# Patient Record
Sex: MALE | Race: AMERICAN INDIAN OR ALASKA NATIVE | NOT HISPANIC OR LATINO | Employment: OTHER | URBAN - METROPOLITAN AREA
[De-identification: names, ages, dates, MRNs, and addresses within clinical notes are randomized per-mention and may not be internally consistent; named-entity substitution may affect disease eponyms.]

---

## 2017-01-25 ENCOUNTER — ANESTHESIA (OUTPATIENT)
Dept: SURGERY | Facility: CLINIC | Age: 52
End: 2017-01-25
Payer: OTHER MISCELLANEOUS

## 2017-01-25 ENCOUNTER — ANESTHESIA EVENT (OUTPATIENT)
Dept: SURGERY | Facility: CLINIC | Age: 52
End: 2017-01-25
Payer: OTHER MISCELLANEOUS

## 2017-01-25 ENCOUNTER — SURGERY (OUTPATIENT)
Age: 52
End: 2017-01-25

## 2017-01-25 PROCEDURE — 25000125 ZZHC RX 250: Performed by: NURSE ANESTHETIST, CERTIFIED REGISTERED

## 2017-01-25 PROCEDURE — 25000128 H RX IP 250 OP 636: Performed by: NURSE ANESTHETIST, CERTIFIED REGISTERED

## 2017-01-25 PROCEDURE — 25000125 ZZHC RX 250: Performed by: PHYSICIAN ASSISTANT

## 2017-01-25 PROCEDURE — S0077 INJECTION, CLINDAMYCIN PHOSP: HCPCS | Performed by: PHYSICIAN ASSISTANT

## 2017-01-25 PROCEDURE — 25800025 ZZH RX 258: Performed by: NURSE ANESTHETIST, CERTIFIED REGISTERED

## 2017-01-25 RX ORDER — PROPOFOL 10 MG/ML
INJECTION, EMULSION INTRAVENOUS PRN
Status: DISCONTINUED | OUTPATIENT
Start: 2017-01-25 | End: 2017-01-25

## 2017-01-25 RX ORDER — FENTANYL CITRATE 50 UG/ML
INJECTION, SOLUTION INTRAMUSCULAR; INTRAVENOUS PRN
Status: DISCONTINUED | OUTPATIENT
Start: 2017-01-25 | End: 2017-01-25

## 2017-01-25 RX ORDER — SODIUM CHLORIDE, SODIUM LACTATE, POTASSIUM CHLORIDE, CALCIUM CHLORIDE 600; 310; 30; 20 MG/100ML; MG/100ML; MG/100ML; MG/100ML
INJECTION, SOLUTION INTRAVENOUS CONTINUOUS PRN
Status: DISCONTINUED | OUTPATIENT
Start: 2017-01-25 | End: 2017-01-25

## 2017-01-25 RX ORDER — GLYCOPYRROLATE 0.2 MG/ML
INJECTION, SOLUTION INTRAMUSCULAR; INTRAVENOUS PRN
Status: DISCONTINUED | OUTPATIENT
Start: 2017-01-25 | End: 2017-01-25

## 2017-01-25 RX ORDER — PROPOFOL 10 MG/ML
INJECTION, EMULSION INTRAVENOUS CONTINUOUS PRN
Status: DISCONTINUED | OUTPATIENT
Start: 2017-01-25 | End: 2017-01-25

## 2017-01-25 RX ORDER — ONDANSETRON 2 MG/ML
INJECTION INTRAMUSCULAR; INTRAVENOUS PRN
Status: DISCONTINUED | OUTPATIENT
Start: 2017-01-25 | End: 2017-01-25

## 2017-01-25 RX ORDER — NEOSTIGMINE METHYLSULFATE 1 MG/ML
VIAL (ML) INJECTION PRN
Status: DISCONTINUED | OUTPATIENT
Start: 2017-01-25 | End: 2017-01-25

## 2017-01-25 RX ORDER — LIDOCAINE HYDROCHLORIDE 20 MG/ML
INJECTION, SOLUTION INFILTRATION; PERINEURAL PRN
Status: DISCONTINUED | OUTPATIENT
Start: 2017-01-25 | End: 2017-01-25

## 2017-01-25 RX ADMIN — PHENYLEPHRINE HYDROCHLORIDE 200 MCG: 10 INJECTION, SOLUTION INTRAMUSCULAR; INTRAVENOUS; SUBCUTANEOUS at 11:57

## 2017-01-25 RX ADMIN — PHENYLEPHRINE HYDROCHLORIDE 200 MCG: 10 INJECTION, SOLUTION INTRAMUSCULAR; INTRAVENOUS; SUBCUTANEOUS at 12:02

## 2017-01-25 RX ADMIN — DEXMEDETOMIDINE 8 MCG: 100 INJECTION, SOLUTION, CONCENTRATE INTRAVENOUS at 13:21

## 2017-01-25 RX ADMIN — PHENYLEPHRINE HYDROCHLORIDE 200 MCG: 10 INJECTION, SOLUTION INTRAMUSCULAR; INTRAVENOUS; SUBCUTANEOUS at 11:56

## 2017-01-25 RX ADMIN — ONDANSETRON 4 MG: 2 INJECTION INTRAMUSCULAR; INTRAVENOUS at 12:14

## 2017-01-25 RX ADMIN — SODIUM CHLORIDE, POTASSIUM CHLORIDE, SODIUM LACTATE AND CALCIUM CHLORIDE: 600; 310; 30; 20 INJECTION, SOLUTION INTRAVENOUS at 11:45

## 2017-01-25 RX ADMIN — MIDAZOLAM HYDROCHLORIDE 2 MG: 1 INJECTION, SOLUTION INTRAMUSCULAR; INTRAVENOUS at 11:45

## 2017-01-25 RX ADMIN — ROCURONIUM BROMIDE 40 MG: 10 INJECTION INTRAVENOUS at 11:47

## 2017-01-25 RX ADMIN — PROPOFOL 200 MG: 10 INJECTION, EMULSION INTRAVENOUS at 11:47

## 2017-01-25 RX ADMIN — PROPOFOL: 10 INJECTION, EMULSION INTRAVENOUS at 12:12

## 2017-01-25 RX ADMIN — PROPOFOL: 10 INJECTION, EMULSION INTRAVENOUS at 12:46

## 2017-01-25 RX ADMIN — CLINDAMYCIN PHOSPHATE 900 MG: 18 INJECTION, SOLUTION INTRAVENOUS at 11:53

## 2017-01-25 RX ADMIN — BUPIVACAINE HYDROCHLORIDE AND EPINEPHRINE BITARTRATE 24 ML: 5; .005 INJECTION, SOLUTION PERINEURAL at 13:17

## 2017-01-25 RX ADMIN — PROPOFOL 200 MCG/KG/MIN: 10 INJECTION, EMULSION INTRAVENOUS at 11:47

## 2017-01-25 RX ADMIN — FENTANYL CITRATE 50 MCG: 50 INJECTION, SOLUTION INTRAMUSCULAR; INTRAVENOUS at 12:07

## 2017-01-25 RX ADMIN — PHENYLEPHRINE HYDROCHLORIDE 200 MCG: 10 INJECTION, SOLUTION INTRAMUSCULAR; INTRAVENOUS; SUBCUTANEOUS at 12:52

## 2017-01-25 RX ADMIN — PHENYLEPHRINE HYDROCHLORIDE 200 MCG: 10 INJECTION, SOLUTION INTRAMUSCULAR; INTRAVENOUS; SUBCUTANEOUS at 13:05

## 2017-01-25 RX ADMIN — ROCURONIUM BROMIDE 10 MG: 10 INJECTION INTRAVENOUS at 12:06

## 2017-01-25 RX ADMIN — LIDOCAINE HYDROCHLORIDE 60 MG: 20 INJECTION, SOLUTION INFILTRATION; PERINEURAL at 11:47

## 2017-01-25 RX ADMIN — PROPOFOL 100 MG: 10 INJECTION, EMULSION INTRAVENOUS at 11:49

## 2017-01-25 RX ADMIN — NEOSTIGMINE METHYLSULFATE 5 MG: 1 INJECTION INTRAMUSCULAR; INTRAVENOUS; SUBCUTANEOUS at 12:54

## 2017-01-25 RX ADMIN — GLYCOPYRROLATE 0.8 MG: 0.2 INJECTION, SOLUTION INTRAMUSCULAR; INTRAVENOUS at 12:54

## 2017-01-25 RX ADMIN — FENTANYL CITRATE 100 MCG: 50 INJECTION, SOLUTION INTRAMUSCULAR; INTRAVENOUS at 11:45

## 2017-01-25 ASSESSMENT — LIFESTYLE VARIABLES: TOBACCO_USE: 1

## 2017-01-25 NOTE — ANESTHESIA CARE TRANSFER NOTE
Patient: Moreno Salvador    TRANSPOSITION ULNAR NERVE (ELBOW) (Left Arm)  ENDOSCOPIC RELEASE CARPAL TUNNEL (Left Wrist)  Additional InformationProcedure(s):  LEFT ULNAR NERVE SUBMUSCULAR TRANSPOSITION, ENDOSCOPIC CARPAL TUNNEL RELEASE  - Wound Class: I-Clean   - Wound Class: I-Clean    Diagnosis: CARPAL TUNNEL AND ULNAR NEURITIS LEFT   Diagnosis Additional Information: No value filed.    Anesthesia Type:   General     Note:  Airway :Face Mask  Patient transferred to:PACU  Comments: Pt to PACU with O2 via mask, airway patent, VSS.  Report to RN.      Vitals: (Last set prior to Anesthesia Care Transfer)              Electronically Signed By: ISRAEL Sadler CRNA  January 25, 2017  1:56 PM

## 2017-01-25 NOTE — ANESTHESIA POSTPROCEDURE EVALUATION
Patient: Moreno Salvador    TRANSPOSITION ULNAR NERVE (ELBOW) (Left Arm)  ENDOSCOPIC RELEASE CARPAL TUNNEL (Left Wrist)  Additional InformationProcedure(s):  LEFT ULNAR NERVE SUBMUSCULAR TRANSPOSITION, ENDOSCOPIC CARPAL TUNNEL RELEASE  - Wound Class: I-Clean   - Wound Class: I-Clean    Diagnosis:CARPAL TUNNEL AND ULNAR NEURITIS LEFT   Diagnosis Additional Information: No value filed.    Anesthesia Type:  General    Note:  Anesthesia Post Evaluation    Patient location during evaluation: PACU  Patient participation: Able to fully participate in evaluation  Level of consciousness: awake  Pain management: adequate  Airway patency: patent  Cardiovascular status: acceptable  Respiratory status: acceptable  Hydration status: acceptable  PONV: controlled     Anesthetic complications: None          Last vitals:  Filed Vitals:    01/25/17 1400 01/25/17 1405 01/25/17 1415   BP: 93/60 93/60 116/73   Temp:      Resp: 20 14 14   SpO2: 97% 97% 92%       Electronically Signed By: Greg Ocasio MD  January 25, 2017  2:21 PM

## 2017-01-25 NOTE — ANESTHESIA PREPROCEDURE EVALUATION
Anesthesia Evaluation     . Pt has had prior anesthetic. Type: General      ROS/MED HX    ENT/Pulmonary:     (+)sleep apnea, tobacco use, Current use , . .    Neurologic:     (+)migraines,     Cardiovascular:         METS/Exercise Tolerance:     Hematologic:         Musculoskeletal:         GI/Hepatic:     (+) liver disease,       Renal/Genitourinary:         Endo:     (+) type II DM Obesity, .      Psychiatric:     (+) psychiatric history bipolar, anxiety and depression      Infectious Disease:         Malignancy:         Other:                              Anesthesia Plan      History & Physical Review  History and physical reviewed and following examination; no interval change.    ASA Status:  3 .        Plan for General with Intravenous induction. Maintenance will be TIVA.    PONV prophylaxis:  Ondansetron (or other 5HT-3) and Dexamethasone or Solumedrol  Propofol, Zofran, and decadron        Postoperative Care  Postoperative pain management:  IV analgesics and Oral pain medications.      Consents  Anesthetic plan, risks, benefits and alternatives discussed with:  Patient..                          .

## 2017-07-20 ENCOUNTER — TRANSFERRED RECORDS (OUTPATIENT)
Dept: HEALTH INFORMATION MANAGEMENT | Facility: CLINIC | Age: 52
End: 2017-07-20

## 2017-09-23 RX ORDER — SENNOSIDES 8.6 MG
1 TABLET ORAL EVERY EVENING
COMMUNITY

## 2017-09-27 ENCOUNTER — ANESTHESIA (OUTPATIENT)
Dept: SURGERY | Facility: CLINIC | Age: 52
End: 2017-09-27
Payer: OTHER MISCELLANEOUS

## 2017-09-27 ENCOUNTER — ANESTHESIA EVENT (OUTPATIENT)
Dept: SURGERY | Facility: CLINIC | Age: 52
End: 2017-09-27
Payer: OTHER MISCELLANEOUS

## 2017-09-27 ENCOUNTER — HOSPITAL ENCOUNTER (OUTPATIENT)
Facility: CLINIC | Age: 52
Discharge: HOME | End: 2017-09-27
Attending: ORTHOPAEDIC SURGERY | Admitting: ORTHOPAEDIC SURGERY
Payer: OTHER MISCELLANEOUS

## 2017-09-27 ENCOUNTER — SURGERY (OUTPATIENT)
Age: 52
End: 2017-09-27

## 2017-09-27 VITALS
HEIGHT: 71 IN | RESPIRATION RATE: 15 BRPM | TEMPERATURE: 97.2 F | BODY MASS INDEX: 41.97 KG/M2 | OXYGEN SATURATION: 96 % | SYSTOLIC BLOOD PRESSURE: 148 MMHG | WEIGHT: 299.8 LBS | DIASTOLIC BLOOD PRESSURE: 103 MMHG

## 2017-09-27 DIAGNOSIS — G56.01 CARPAL TUNNEL SYNDROME OF RIGHT WRIST: Primary | ICD-10-CM

## 2017-09-27 LAB
GLUCOSE BLDC GLUCOMTR-MCNC: 157 MG/DL (ref 70–99)
GLUCOSE BLDC GLUCOMTR-MCNC: 164 MG/DL (ref 70–99)

## 2017-09-27 PROCEDURE — 27210794 ZZH OR GENERAL SUPPLY STERILE: Performed by: ORTHOPAEDIC SURGERY

## 2017-09-27 PROCEDURE — 71000012 ZZH RECOVERY PHASE 1 LEVEL 1 FIRST HR: Performed by: ORTHOPAEDIC SURGERY

## 2017-09-27 PROCEDURE — 82962 GLUCOSE BLOOD TEST: CPT | Mod: 91

## 2017-09-27 PROCEDURE — S0077 INJECTION, CLINDAMYCIN PHOSP: HCPCS | Performed by: PHYSICIAN ASSISTANT

## 2017-09-27 PROCEDURE — 25000132 ZZH RX MED GY IP 250 OP 250 PS 637: Performed by: PHYSICIAN ASSISTANT

## 2017-09-27 PROCEDURE — 36000050 ZZH SURGERY LEVEL 2 1ST 30 MIN: Performed by: ORTHOPAEDIC SURGERY

## 2017-09-27 PROCEDURE — 25000125 ZZHC RX 250: Performed by: PHYSICIAN ASSISTANT

## 2017-09-27 PROCEDURE — 37000008 ZZH ANESTHESIA TECHNICAL FEE, 1ST 30 MIN: Performed by: ORTHOPAEDIC SURGERY

## 2017-09-27 PROCEDURE — 40000170 ZZH STATISTIC PRE-PROCEDURE ASSESSMENT II: Performed by: ORTHOPAEDIC SURGERY

## 2017-09-27 PROCEDURE — 71000027 ZZH RECOVERY PHASE 2 EACH 15 MINS: Performed by: ORTHOPAEDIC SURGERY

## 2017-09-27 PROCEDURE — 25000125 ZZHC RX 250: Performed by: ORTHOPAEDIC SURGERY

## 2017-09-27 PROCEDURE — 36000052 ZZH SURGERY LEVEL 2 EA 15 ADDTL MIN: Performed by: ORTHOPAEDIC SURGERY

## 2017-09-27 PROCEDURE — 71000013 ZZH RECOVERY PHASE 1 LEVEL 1 EA ADDTL HR: Performed by: ORTHOPAEDIC SURGERY

## 2017-09-27 PROCEDURE — 25000125 ZZHC RX 250: Performed by: NURSE ANESTHETIST, CERTIFIED REGISTERED

## 2017-09-27 PROCEDURE — 37000009 ZZH ANESTHESIA TECHNICAL FEE, EACH ADDTL 15 MIN: Performed by: ORTHOPAEDIC SURGERY

## 2017-09-27 PROCEDURE — 25000128 H RX IP 250 OP 636: Performed by: NURSE ANESTHETIST, CERTIFIED REGISTERED

## 2017-09-27 RX ORDER — LIDOCAINE HYDROCHLORIDE 20 MG/ML
INJECTION, SOLUTION INFILTRATION; PERINEURAL PRN
Status: DISCONTINUED | OUTPATIENT
Start: 2017-09-27 | End: 2017-09-27

## 2017-09-27 RX ORDER — BUPIVACAINE HYDROCHLORIDE AND EPINEPHRINE 5; 5 MG/ML; UG/ML
INJECTION, SOLUTION PERINEURAL PRN
Status: DISCONTINUED | OUTPATIENT
Start: 2017-09-27 | End: 2017-09-27 | Stop reason: HOSPADM

## 2017-09-27 RX ORDER — OXYCODONE HYDROCHLORIDE 10 MG/1
TABLET ORAL
Qty: 60 TABLET | Refills: 0 | Status: SHIPPED | OUTPATIENT
Start: 2017-09-27

## 2017-09-27 RX ORDER — ONDANSETRON 2 MG/ML
INJECTION INTRAMUSCULAR; INTRAVENOUS PRN
Status: DISCONTINUED | OUTPATIENT
Start: 2017-09-27 | End: 2017-09-27

## 2017-09-27 RX ORDER — MEPERIDINE HYDROCHLORIDE 25 MG/ML
12.5 INJECTION INTRAMUSCULAR; INTRAVENOUS; SUBCUTANEOUS
Status: DISCONTINUED | OUTPATIENT
Start: 2017-09-27 | End: 2017-09-27 | Stop reason: HOSPADM

## 2017-09-27 RX ORDER — CLINDAMYCIN PHOSPHATE 900 MG/50ML
900 INJECTION, SOLUTION INTRAVENOUS SEE ADMIN INSTRUCTIONS
Status: DISCONTINUED | OUTPATIENT
Start: 2017-09-27 | End: 2017-09-27 | Stop reason: HOSPADM

## 2017-09-27 RX ORDER — MULTIPLE VITAMINS W/ MINERALS TAB 9MG-400MCG
1 TAB ORAL DAILY
COMMUNITY

## 2017-09-27 RX ORDER — HYDROMORPHONE HYDROCHLORIDE 1 MG/ML
.3-.5 INJECTION, SOLUTION INTRAMUSCULAR; INTRAVENOUS; SUBCUTANEOUS EVERY 10 MIN PRN
Status: DISCONTINUED | OUTPATIENT
Start: 2017-09-27 | End: 2017-09-27 | Stop reason: HOSPADM

## 2017-09-27 RX ORDER — SODIUM CHLORIDE, SODIUM LACTATE, POTASSIUM CHLORIDE, CALCIUM CHLORIDE 600; 310; 30; 20 MG/100ML; MG/100ML; MG/100ML; MG/100ML
INJECTION, SOLUTION INTRAVENOUS CONTINUOUS
Status: DISCONTINUED | OUTPATIENT
Start: 2017-09-27 | End: 2017-09-27 | Stop reason: HOSPADM

## 2017-09-27 RX ORDER — FENTANYL CITRATE 50 UG/ML
INJECTION, SOLUTION INTRAMUSCULAR; INTRAVENOUS PRN
Status: DISCONTINUED | OUTPATIENT
Start: 2017-09-27 | End: 2017-09-27

## 2017-09-27 RX ORDER — PROPOFOL 10 MG/ML
INJECTION, EMULSION INTRAVENOUS PRN
Status: DISCONTINUED | OUTPATIENT
Start: 2017-09-27 | End: 2017-09-27

## 2017-09-27 RX ORDER — NALOXONE HYDROCHLORIDE 0.4 MG/ML
.1-.4 INJECTION, SOLUTION INTRAMUSCULAR; INTRAVENOUS; SUBCUTANEOUS
Status: DISCONTINUED | OUTPATIENT
Start: 2017-09-27 | End: 2017-09-27 | Stop reason: HOSPADM

## 2017-09-27 RX ORDER — SODIUM SULFACETAMIDE 100 MG/ML
LIQUID TOPICAL DAILY
COMMUNITY

## 2017-09-27 RX ORDER — PROPOFOL 10 MG/ML
INJECTION, EMULSION INTRAVENOUS CONTINUOUS PRN
Status: DISCONTINUED | OUTPATIENT
Start: 2017-09-27 | End: 2017-09-27

## 2017-09-27 RX ORDER — CLINDAMYCIN PHOSPHATE 900 MG/50ML
900 INJECTION, SOLUTION INTRAVENOUS
Status: COMPLETED | OUTPATIENT
Start: 2017-09-27 | End: 2017-09-27

## 2017-09-27 RX ORDER — FENUGREEK SEED/BL.THISTLE/ANIS 340 MG
1 CAPSULE ORAL DAILY
COMMUNITY

## 2017-09-27 RX ORDER — SODIUM CHLORIDE, SODIUM LACTATE, POTASSIUM CHLORIDE, CALCIUM CHLORIDE 600; 310; 30; 20 MG/100ML; MG/100ML; MG/100ML; MG/100ML
INJECTION, SOLUTION INTRAVENOUS CONTINUOUS PRN
Status: DISCONTINUED | OUTPATIENT
Start: 2017-09-27 | End: 2017-09-27

## 2017-09-27 RX ORDER — FENTANYL CITRATE 50 UG/ML
25-50 INJECTION, SOLUTION INTRAMUSCULAR; INTRAVENOUS EVERY 5 MIN PRN
Status: DISCONTINUED | OUTPATIENT
Start: 2017-09-27 | End: 2017-09-27 | Stop reason: HOSPADM

## 2017-09-27 RX ORDER — OXYCODONE HYDROCHLORIDE 5 MG/1
10 TABLET ORAL ONCE
Status: COMPLETED | OUTPATIENT
Start: 2017-09-27 | End: 2017-09-27

## 2017-09-27 RX ORDER — FENTANYL CITRATE 50 UG/ML
25-50 INJECTION, SOLUTION INTRAMUSCULAR; INTRAVENOUS
Status: DISCONTINUED | OUTPATIENT
Start: 2017-09-27 | End: 2017-09-27 | Stop reason: HOSPADM

## 2017-09-27 RX ORDER — ONDANSETRON 2 MG/ML
4 INJECTION INTRAMUSCULAR; INTRAVENOUS EVERY 30 MIN PRN
Status: DISCONTINUED | OUTPATIENT
Start: 2017-09-27 | End: 2017-09-27 | Stop reason: HOSPADM

## 2017-09-27 RX ORDER — GINSENG 100 MG
CAPSULE ORAL PRN
Status: DISCONTINUED | OUTPATIENT
Start: 2017-09-27 | End: 2017-09-27 | Stop reason: HOSPADM

## 2017-09-27 RX ORDER — CLINDAMYCIN PHOSPHATE 900 MG/50ML
900 INJECTION, SOLUTION INTRAVENOUS
Status: DISCONTINUED | OUTPATIENT
Start: 2017-09-27 | End: 2017-09-27 | Stop reason: HOSPADM

## 2017-09-27 RX ORDER — ONDANSETRON 4 MG/1
4 TABLET, ORALLY DISINTEGRATING ORAL EVERY 30 MIN PRN
Status: DISCONTINUED | OUTPATIENT
Start: 2017-09-27 | End: 2017-09-27 | Stop reason: HOSPADM

## 2017-09-27 RX ADMIN — CLINDAMYCIN PHOSPHATE 900 MG: 18 INJECTION, SOLUTION INTRAVENOUS at 09:51

## 2017-09-27 RX ADMIN — OXYCODONE HYDROCHLORIDE 10 MG: 5 TABLET ORAL at 12:18

## 2017-09-27 RX ADMIN — LIDOCAINE HYDROCHLORIDE 40 MG: 20 INJECTION, SOLUTION INFILTRATION; PERINEURAL at 09:54

## 2017-09-27 RX ADMIN — MIDAZOLAM HYDROCHLORIDE 1 MG: 1 INJECTION, SOLUTION INTRAMUSCULAR; INTRAVENOUS at 09:54

## 2017-09-27 RX ADMIN — PROPOFOL 20 MG: 10 INJECTION, EMULSION INTRAVENOUS at 10:24

## 2017-09-27 RX ADMIN — FENTANYL CITRATE 25 MCG: 50 INJECTION, SOLUTION INTRAMUSCULAR; INTRAVENOUS at 10:23

## 2017-09-27 RX ADMIN — FENTANYL CITRATE 50 MCG: 50 INJECTION, SOLUTION INTRAMUSCULAR; INTRAVENOUS at 09:54

## 2017-09-27 RX ADMIN — BUPIVACAINE HYDROCHLORIDE AND EPINEPHRINE BITARTRATE 10 ML: 5; .005 INJECTION, SOLUTION PERINEURAL at 10:46

## 2017-09-27 RX ADMIN — MIDAZOLAM HYDROCHLORIDE 1 MG: 1 INJECTION, SOLUTION INTRAMUSCULAR; INTRAVENOUS at 10:02

## 2017-09-27 RX ADMIN — SODIUM CHLORIDE, POTASSIUM CHLORIDE, SODIUM LACTATE AND CALCIUM CHLORIDE: 600; 310; 30; 20 INJECTION, SOLUTION INTRAVENOUS at 09:51

## 2017-09-27 RX ADMIN — ONDANSETRON 4 MG: 2 INJECTION INTRAMUSCULAR; INTRAVENOUS at 10:00

## 2017-09-27 RX ADMIN — FENTANYL CITRATE 25 MCG: 50 INJECTION, SOLUTION INTRAMUSCULAR; INTRAVENOUS at 10:19

## 2017-09-27 RX ADMIN — PROPOFOL 50 MCG/KG/MIN: 10 INJECTION, EMULSION INTRAVENOUS at 09:54

## 2017-09-27 RX ADMIN — BACITRACIN 2 G: 500 OINTMENT TOPICAL at 10:46

## 2017-09-27 ASSESSMENT — LIFESTYLE VARIABLES: TOBACCO_USE: 1

## 2017-09-27 NOTE — ANESTHESIA POSTPROCEDURE EVALUATION
Patient: Moreno Salvador    Procedure(s):  RIGHT OPEN CARPAL TUNNEL RELEASE WITH FAT FLAP; GUYON'S CANAL RELEASE - Wound Class: I-Clean    Diagnosis:COMPRESSION OF THE NERVES OF THE WRIST  Diagnosis Additional Information: No value filed.    Anesthesia Type:  IV Regional Anesthesia    Note:  Anesthesia Post Evaluation    Patient location during evaluation: PACU  Patient participation: Able to fully participate in evaluation  Level of consciousness: awake  Pain management: adequate  Airway patency: patent  Cardiovascular status: acceptable  Respiratory status: acceptable  Hydration status: acceptable  PONV: none     Anesthetic complications: None          Last vitals:  Vitals:    09/27/17 1200 09/27/17 1215 09/27/17 1230   BP: (!) 152/101 (!) 148/103    Resp: 17 15    Temp:      SpO2: 96% 96% 96%         Electronically Signed By: Brody Greco MD  September 27, 2017  1:04 PM

## 2017-09-27 NOTE — IP AVS SNAPSHOT
MRN:[id number]                      After Visit Summary   9/27/2017    Moreno Salvador    MRN: [id number]           Thank you!     Thank you for choosing [facility] for your care. Our goal is always to provide you with excellent care. Hearing back from our patients is one way we can continue to improve our services. Please take a few minutes to complete the written survey that you may receive in the mail after you visit with us. Thank you!        Patient Information     Date Of Birth          1965        About your hospital stay     You were admitted on:  September 27, 2017 You last received care in the:  [facility]    You were discharged on:  September 27, 2017       Who to Call     For medical emergencies, please call 911.  For non-urgent questions about your medical care, please call your primary care provider or clinic, [phone]  For questions related to your surgery, please call your surgery clinic        Attending Provider     Provider Kelli Gong MD Orthopedics       Primary Care Provider Office Phone # Fax #    Kang Mccarthy [phone]      After Care Instructions     Discharge Instructions       Post-Operative Instructions  [facility]  Siri Oscar M.D.    PAIN    You may have numbing medication at your surgical site. As this medicine wears off, you can take the pain pills prescribed for you. Keep your arm elevated above your heart for the first 24 hours following surgery to reduce swelling and pain.    BANDAGE    Leave your bandage on and keep it clean and dry until you are seen in the clinic for a follow-up visit. If you take a shower, cover it with plastic wrap or a plastic bag.    SLING    If you receive a sling for your arm before you leave the hospital, you can wear it until your nerve block has lost its effect. After that, you should avoid using the sling, otherwise, you can develop  neck and shoulder stiffness and discomfort.    ACTIVITIES    After surgery, begin moving your fingers gently. You may use your hand for light activities and driving while wearing the bandage. You may also return to work for light duty. Do not do any heavy lifting, pushing, or pulling with your hand.     FOLLOW-UP    You will need to come back for a checkup 10 to 14 days after your surgery. Call [phone] as soon as possible to make your appointment to see Rose Mary Chang PA-C. You can be seen at [facility] ([facility] [address] [place] [address], 2nd floor, [place]) on Tuesday or Thursday. Rose Mary Chang, my physician assistant, will examine your incision and take out your stitches. After that, you will need to wear the removable splint you will receive in the clinic. Your next appointment will be 4 weeks later with Dr. Oscar.    Call Rose Mary Chang at [phone] between 8:30 a.m. and 5:00 p.m. if you have:   A fever (101 F or higher)  Redness, swelling, or draining at the surgical site  Nausea, vomiting, or a rash from your medicine(s)  Any questions, problems, or concerns    For emergencies after 5:00 p.m., call the on-call physician at [phone].                  Further instructions from your care team         Same Day Surgery Discharge Instructions for  Sedation and General Anesthesia       It's not unusual to feel dizzy, light-headed or faint for up to 24 hours after surgery or while taking pain medication.  If you have these symptoms: sit for a few minutes before standing and have someone assist you when you get up to walk or use the bathroom.      You should rest and relax for the next 24 hours. We recommend you make arrangements to have an adult stay with you for at least 24 hours after your discharge.  Avoid hazardous and strenuous activity.      DO NOT DRIVE any vehicle or operate mechanical equipment for 24 hours following the end of your surgery.  Even though you may feel normal, your reactions may be  "affected by the medication you have received.      Do not drink alcoholic beverages for 24 hours following surgery.       Slowly progress to your regular diet as you feel able. It's not unusual to feel nauseated and/or vomit after receiving anesthesia.  If you develop these symptoms, drink clear liquids (apple juice, ginger ale, broth, 7-up, etc. ) until you feel better.  If your nausea and vomiting persists for 24 hours, please notify your surgeon.        All narcotic pain medications, along with inactivity and anesthesia, can cause constipation. Drinking plenty of liquids and increasing fiber intake will help.      For any questions of a medical nature, call your surgeon.      Do not make important decisions for 24 hours.      If you had general anesthesia, you may have a sore throat for a couple of days related to the breathing tube used during surgery.  You may use Cepacol lozenges to help with this discomfort.  If it worsens or if you develop a fever, contact your surgeon.       If you feel your pain is not well managed with the pain medications prescribed by your surgeon, please contact your surgeon's office to let them know so they can address your concerns.         **Because you had anesthesia today and your history of sleep apnea, it is extremely important that you use your CPAP machine for the next 24 hours while napping or sleeping.**          **If you have questions or concerns about your procedure,  call Dr. Oscar at [phone]**          Pending Results     No orders found from 9/25/2017 to 9/28/2017.            Admission Information     Date & Time Provider Department Dept. Phone    9/27/2017 Kelli Oscar MD [facility] Same Day Surgery [phone]      Your Vitals Were     Blood Pressure Temperature Respirations Height Weight Pulse Oximetry    148/103 97.2  F (36.2  C) (Temporal) 15 1.803 m (5' 11\") 136 kg (299 lb 12.8 oz) 96%    BMI (Body Mass Index)                   " "41.81 kg/m2           Agent Panda Information     Agent Panda lets you send messages to your doctor, view your test results, renew your prescriptions, schedule appointments and more. To sign up, go to [email or web address] . Click on \"Log in\" on the left side of the screen, which will take you to the Welcome page. Then click on \"Sign up Now\" on the right side of the page.     You will be asked to enter the access code listed below, as well as some personal information. Please follow the directions to create your username and password.     Your access code is: [email or web address]  Expires: 2017 11:35 AM     Your access code will  in 90 days. If you need help or a new code, please call your [facility] clinic or [phone].        Care EveryWhere ID     This is your Care EveryWhere ID. This could be used by other organizations to access your [facility] medical records  [id number]        Equal Access to Services     SHAYE CONNORS : Hadii sylvie trano Sofernie, waaxda luqadaha, qaybta kaalmada adeegyalita, jodi key . So [facility] [phone].    ATENCIÓN: Si habla español, tiene a stack disposición servicios gratuitos de asistencia lingüística. Chrissie al [phone].    We comply with applicable federal civil rights laws and [place] laws. We do not discriminate on the basis of race, color, national origin, age, disability sex, sexual orientation or gender identity.               Review of your medicines      START taking        Dose / Directions    oxyCODONE 10 MG IR tablet   Commonly known as:  ROXICODONE   Used for:  Carpal tunnel syndrome of right wrist   Notes to Patient:  10 mg taken at 12:18pm.        Take 1 to 2 tablets every 4 to 6 hours for pain if needed.   Quantity:  60 tablet   Refills:  0         CONTINUE these medicines which have NOT CHANGED        Dose / Directions    AMITRIPTYLINE HCL PO        Dose:  30 mg   Take 30 mg by mouth At Bedtime (3 x 10 mg tablet = 30 mg dose) "   Refills:  0       ASPIRIN PO        Dose:  81 mg   Take 81 mg by mouth daily   Refills:  0       CITRUCEL Powd   Generic drug:  methylcellulose (laxative)        Dose:  3 teaspoonful   Take 3 teaspoonful by mouth every evening   Refills:  0       FLEXERIL PO        Dose:  10 mg   Take 10 mg by mouth At Bedtime   Refills:  0       GABAPENTIN PO        Dose:  1200 mg   Take 1,200 mg by mouth At Bedtime (2 x 600 mg tablet = 1200 mg dose)   Refills:  0       * HUMULIN R U-500 KWIKPEN 500 UNIT/ML PEN soln   Generic drug:  insulin reg HIGH CONC        Dose:  140 Units   Inject 140 Units Subcutaneous every morning (before breakfast)   Refills:  0       * HUMULIN R U-500 KWIKPEN 500 UNIT/ML PEN soln   Generic drug:  insulin reg HIGH CONC        Dose:  200 Units   Inject 200 Units Subcutaneous At Bedtime   Refills:  0       LIPITOR PO        Dose:  10 mg   Take 10 mg by mouth daily   Refills:  0       MAXALT-MLT PO        Dose:  10 mg   Take 10 mg by mouth once as needed for migraine (MAY REPEAT ONCE IN 1-2 HOURS)   Refills:  0       Multi-vitamin Tabs tablet        Dose:  1 tablet   Take 1 tablet by mouth daily   Refills:  0       NAPROXEN PO        Dose:  500 mg   Take 500 mg by mouth nightly as needed for moderate pain   Refills:  0       Probiotic Pack        Dose:  1 packet   Take 1 packet by mouth daily (Mix powder packet with 8oz liquid)   Refills:  0       sennosides 8.6 MG tablet   Commonly known as:  SENOKOT        Dose:  1 tablet   Take 1 tablet by mouth every evening   Refills:  0       Sulfacetamide Sodium 10 % Liqd        Externally apply topically daily (to face)   Refills:  0       TOPAMAX PO        Dose:  50 mg   Take 50 mg by mouth every evening (2 x 50 mg = 100 mg dose)   Refills:  0       TRAMADOL HCL PO        Dose:   mg   Take  mg by mouth At Bedtime   Refills:  0       VISTARIL PO        Dose:  25 mg   Take 25 mg by mouth nightly as needed for itching   Refills:  0       * Notice:  This  list has 2 medication(s) that are the same as other medications prescribed for you. Read the directions carefully, and ask your doctor or other care provider to review them with you.         Where to get your medicines      Some of these will need a paper prescription and others can be bought over the counter. Ask your nurse if you have questions.     Bring a paper prescription for each of these medications     oxyCODONE 10 MG IR tablet                Protect others around you: Learn how to safely use, store and throw away your medicines at www.disposemymeds.org.             Medication List: This is a list of all your medications and when to take them. Check marks below indicate your daily home schedule. Keep this list as a reference.      Medications           Morning Afternoon Evening Bedtime As Needed    AMITRIPTYLINE HCL PO   Take 30 mg by mouth At Bedtime (3 x 10 mg tablet = 30 mg dose)                                ASPIRIN PO   Take 81 mg by mouth daily                                CITRUCEL Powd   Take 3 teaspoonful by mouth every evening   Generic drug:  methylcellulose (laxative)                                FLEXERIL PO   Take 10 mg by mouth At Bedtime                                GABAPENTIN PO   Take 1,200 mg by mouth At Bedtime (2 x 600 mg tablet = 1200 mg dose)                                * HUMULIN R U-500 KWIKPEN 500 UNIT/ML PEN soln   Inject 140 Units Subcutaneous every morning (before breakfast)   Generic drug:  insulin reg HIGH CONC                                * HUMULIN R U-500 KWIKPEN 500 UNIT/ML PEN soln   Inject 200 Units Subcutaneous At Bedtime   Generic drug:  insulin reg HIGH CONC                                LIPITOR PO   Take 10 mg by mouth daily                                MAXALT-MLT PO   Take 10 mg by mouth once as needed for migraine (MAY REPEAT ONCE IN 1-2 HOURS)                                Multi-vitamin Tabs tablet   Take 1 tablet by mouth daily                                 NAPROXEN PO   Take 500 mg by mouth nightly as needed for moderate pain                                oxyCODONE 10 MG IR tablet   Commonly known as:  ROXICODONE   Take 1 to 2 tablets every 4 to 6 hours for pain if needed.   Last time this was given:  10 mg on 9/27/2017 12:18 PM   Notes to Patient:  10 mg taken at 12:18pm.                                Probiotic Pack   Take 1 packet by mouth daily (Mix powder packet with 8oz liquid)                                sennosides 8.6 MG tablet   Commonly known as:  SENOKOT   Take 1 tablet by mouth every evening                                Sulfacetamide Sodium 10 % Liqd   Externally apply topically daily (to face)                                TOPAMAX PO   Take 50 mg by mouth every evening (2 x 50 mg = 100 mg dose)                                TRAMADOL HCL PO   Take  mg by mouth At Bedtime                                VISTARIL PO   Take 25 mg by mouth nightly as needed for itching                                * Notice:  This list has 2 medication(s) that are the same as other medications prescribed for you. Read the directions carefully, and ask your doctor or other care provider to review them with you.

## 2017-09-27 NOTE — IP AVS SNAPSHOT
[facility] Same Day [facility]    [address]    [place] [address]    Phone:  [phone]    Fax:  [phone]                                       After Visit Summary   9/27/2017    Moreno Salvador    MRN: [id number]           After Visit Summary Signature Page     I have received my discharge instructions, and my questions have been answered. I have discussed any challenges I see with this plan with the nurse or doctor.    ..........................................................................................................................................  Patient/Patient Representative Signature      ..........................................................................................................................................  Patient Representative Print Name and Relationship to Patient    ..................................................               ................................................  Date                                            Time    ..........................................................................................................................................  Reviewed by Signature/Title    ...................................................              ..............................................  Date                                                            Time

## 2017-09-27 NOTE — BRIEF OP NOTE
[facility] Brief Operative Note    Pre-operative diagnosis: COMPRESSION OF THE ULNAR AND MEDIAN NERVE OF THE WRIST   Post-operative diagnosis same   Procedure: Procedure(s):  RIGHT OPEN CARPAL TUNNEL RELEASE WITH FAT FLAP; GUYON'S CANAL RELEASE - Wound Class: I-Clean   Surgeon(s): Surgeon(s) and Role:     * Kelli Oscar MD - Primary     * Rose Mary Chang PA-C - Assisting   Estimated blood loss: * No values recorded between 9/27/2017 10:09 AM and 9/27/2017 11:12 AM *    Specimens: * No specimens in log *   Findings: AS ABOVE

## 2017-09-27 NOTE — DISCHARGE INSTRUCTIONS
Same Day Surgery Discharge Instructions for  Sedation and General Anesthesia       It's not unusual to feel dizzy, light-headed or faint for up to 24 hours after surgery or while taking pain medication.  If you have these symptoms: sit for a few minutes before standing and have someone assist you when you get up to walk or use the bathroom.      You should rest and relax for the next 24 hours. We recommend you make arrangements to have an adult stay with you for at least 24 hours after your discharge.  Avoid hazardous and strenuous activity.      DO NOT DRIVE any vehicle or operate mechanical equipment for 24 hours following the end of your surgery.  Even though you may feel normal, your reactions may be affected by the medication you have received.      Do not drink alcoholic beverages for 24 hours following surgery.       Slowly progress to your regular diet as you feel able. It's not unusual to feel nauseated and/or vomit after receiving anesthesia.  If you develop these symptoms, drink clear liquids (apple juice, ginger ale, broth, 7-up, etc. ) until you feel better.  If your nausea and vomiting persists for 24 hours, please notify your surgeon.        All narcotic pain medications, along with inactivity and anesthesia, can cause constipation. Drinking plenty of liquids and increasing fiber intake will help.      For any questions of a medical nature, call your surgeon.      Do not make important decisions for 24 hours.      If you had general anesthesia, you may have a sore throat for a couple of days related to the breathing tube used during surgery.  You may use Cepacol lozenges to help with this discomfort.  If it worsens or if you develop a fever, contact your surgeon.       If you feel your pain is not well managed with the pain medications prescribed by your surgeon, please contact your surgeon's office to let them know so they can address your concerns.         **Because you had anesthesia today and  your history of sleep apnea, it is extremely important that you use your CPAP machine for the next 24 hours while napping or sleeping.**          **If you have questions or concerns about your procedure,  call Dr. Oscar at [phone]**

## 2017-09-27 NOTE — PROGRESS NOTES
Admission medication history interview status for the 9/27/2017  admission is complete. See EPIC admission navigator for prior to admission medications     Medication history source reliability:Good    Medication history interview source(s):Patient    Medication history resources (including written lists, pill bottles, clinic record):Patient brought a list from home    Primary pharmacy.[facility]    Additional medication history information not noted on PTA med list :None    Time spent in this activity: 40 minutes    Prior to Admission medications    Medication Sig Last Dose Taking? Auth Provider   Atorvastatin Calcium (LIPITOR PO) Take 10 mg by mouth daily 9/27/2017 at 0530 Yes Reported, Patient   HydrOXYzine Pamoate (VISTARIL PO) Take 25 mg by mouth nightly as needed for itching 9/26/2017 at HS Yes Reported, Patient   Sulfacetamide Sodium 10 % LIQD Externally apply topically daily (to face) 9/27/2017 at 0500 Yes Reported, Patient   multivitamin, therapeutic with minerals (MULTI-VITAMIN) TABS tablet Take 1 tablet by mouth daily 9/26/2017 at AM Yes Reported, Patient   NAPROXEN PO Take 500 mg by mouth nightly as needed for moderate pain 9/26/2017 at HS Yes Reported, Patient   methylcellulose, laxative, (CITRUCEL) POWD Take 3 teaspoonful by mouth every evening 9/26/2017 at 1800 Yes Reported, Patient   Probiotic PACK Take 1 packet by mouth daily (Mix powder packet with 8oz liquid) 9/26/2017 at AM Yes Reported, Patient   Cyclobenzaprine HCl (FLEXERIL PO) Take 10 mg by mouth At Bedtime 9/26/2017 at HS Yes Reported, Patient   sennosides (SENOKOT) 8.6 MG tablet Take 1 tablet by mouth every evening  9/26/2017 at PM Yes Reported, Patient   Topiramate (TOPAMAX PO) Take 50 mg by mouth every evening (2 x 50 mg = 100 mg dose) 9/26/2017 at PM Yes Reported, Patient   TRAMADOL HCL PO Take  mg by mouth At Bedtime 9/26/2017 at HS Yes Reported, Patient   Rizatriptan Benzoate (MAXALT-MLT PO) Take 10 mg by mouth once as needed for  migraine (MAY REPEAT ONCE IN 1-2 HOURS) 9/13/2017 at PRN Yes Reported, Patient   insulin reg HIGH CONC (HUMULIN R U-500 KWIKPEN) 500 UNIT/ML PEN soln Inject 140 Units Subcutaneous every morning (before breakfast)  9/26/2017 at AM Yes Reported, Patient   insulin reg HIGH CONC (HUMULIN R U-500 KWIKPEN) 500 UNIT/ML PEN soln Inject 200 Units Subcutaneous At Bedtime  9/26/2017 (100 units) at PM Yes Reported, Patient   AMITRIPTYLINE HCL PO Take 30 mg by mouth At Bedtime (3 x 10 mg tablet = 30 mg dose) 9/26/2017 at HS Yes Reported, Patient   GABAPENTIN PO Take 1,200 mg by mouth At Bedtime (2 x 600 mg tablet = 1200 mg dose) 9/26/2017 at HS Yes Reported, Patient   ASPIRIN PO Take 81 mg by mouth daily 9/20/2017 at AM Yes Reported, Patient

## 2017-09-27 NOTE — ANESTHESIA CARE TRANSFER NOTE
Patient: Moreno Salvador    Procedure(s):  RIGHT OPEN CARPAL TUNNEL RELEASE WITH FAT FLAP; GUYON'S CANAL RELEASE - Wound Class: I-Clean    Diagnosis: COMPRESSION OF THE NERVES OF THE WRIST  Diagnosis Additional Information: No value filed.    Anesthesia Type:   IV Regional Anesthesia     Note:  Airway :Face Mask  Patient transferred to:PACU  Comments: Patient to PACU, 10L o2 via face mask, exchanging well, VSS; Stable transfer of care, report to RN.        Vitals: (Last set prior to Anesthesia Care Transfer)    CRNA VITALS  9/27/2017 1044 - 9/27/2017 1118      9/27/2017             Resp Rate (set): 10                Electronically Signed By: ISRAEL Vyas CRNA  September 27, 2017  11:18 AM

## 2017-09-27 NOTE — ANESTHESIA PREPROCEDURE EVALUATION
Anesthesia Evaluation     . Pt has had prior anesthetic.     History of anesthetic complications   - PONV        ROS/MED HX    ENT/Pulmonary:     (+)sleep apnea, tobacco use, Current use , . .    Neurologic:     (+)migraines,     Cardiovascular:         METS/Exercise Tolerance:     Hematologic:         Musculoskeletal:         GI/Hepatic:     (+) liver disease,       Renal/Genitourinary:         Endo:     (+) type II DM Obesity, .   (-) Type I DM   Psychiatric:     (+) psychiatric history anxiety, bipolar and depression      Infectious Disease:         Malignancy:         Other:                     Physical Exam  Normal systems: cardiovascular, pulmonary and dental    Airway   Mallampati: II  TM distance: >3 FB  Neck ROM: full    Dental     Cardiovascular   Rhythm and rate: regular and normal      Pulmonary    breath sounds clear to auscultation                    Anesthesia Plan      History & Physical Review  History and physical reviewed and following examination; no interval change.    ASA Status:  3 .    NPO Status:  > 8 hours    Plan for IV Regional Anesthesia with Intravenous induction.   PONV prophylaxis:  Ondansetron (or other 5HT-3) and Dexamethasone or Solumedrol  IV REGIONAL BLOCK WITH SEDATION      Postoperative Care  Postoperative pain management:  IV analgesics and Oral pain medications.      Consents  Anesthetic plan, risks, benefits and alternatives discussed with:  Patient..                          .

## 2017-09-27 NOTE — OR NURSING
**Because you had anesthesia today and your history of sleep apnea, it is extremely important that you use your CPAP machine for the next 24 hours while napping or sleeping.**

## 2017-09-28 NOTE — OP NOTE
DATE OF PROCEDURE:  09/27/2017      PREOPERATIVE DIAGNOSIS:  Right carpal tunnel and right Guyon's canal compression.       POSTOPERATIVE DIAGNOSIS:  Right carpal tunnel and right Guyon's canal compression.       PROCEDURE:  Right open carpal tunnel release with a fat flap and right Guyon's canal release.       SURGEON:  Kelli Oscar MD      ASSISTANT:   CARLOS JONES PA-C      ANESTHESIA:  Jose block.      TOURNIQUET TIME:  45 minutes.      Surgical site was signed by me and confirmed at the timeout.  Informed consent was obtained in the holding area and confirmed prior to entrance to the operating room.  Prophylactic antibiotics were given prior to tourniquet inflation and DVT risk assessment was as per ACS protocol.      FINDINGS:  Moreno Salvador had a clear area of compression on the median nerve in the palm area about 2 fingerbreadths distal to the distal wrist crease.  The nerve was very narrowed, had an hourglass type figure at that point and increased induration.  The patient also had thrombosed ulnar vein in Guyon's canal.      DESCRIPTION OF PROCEDURE:  He was brought to the operating room, given a [place] block anesthetic and his hand was prepped and draped in the normal standard manner.  I designed an incision so I could do both surgeries through the same incision, so I did a curvilinear incision in the palm between the thenar and hypothenar crease, then brought it across the wrist apex ulnar, back across the wrist and then down the midline.  I extended that a good 3 cm so I could elevate flaps.  The patient is a very large man with a lot of subcutaneous fat, so once we identified the antebrachial fascia, a Flat Top was placed beneath the transverse carpal ligament.  We then dissected down using Sens radially and ulnarly through the palmar fascia and the antebrachial fascia down onto the Flat Top.  Note that the patient's palmar fascia and the antebrachial fascia was significantly thick.  Once I  got into the canal I could see that the nerve had an hourglass type figure and ischemia type changes distal to that hourglass shape.  The patient also had a motor branch that came off right at that radial edge.  The ulnar fat then was dissected away from the subcutaneous tissue so I could bring that down on top of the nerve.  That was sewn into the radial side of the transverse carpal ligament with 3-0 undyed Vicryl in a mattress type fashion.  Next, back at the wrist I identified the ulnar neurovascular bundle after freeing up the antebrachial fascia over the ulnar nerve.  I then dissected that into the palm and the patient was noted to have thrombosed ulnar vessel in about 3 different locales.  The nerve was freed up from overlying fascia, dissected down into the motor and sensory branches.  Marcaine with epi. was injected.  The tourniquet was let down and then the skin was closed with 4-0 nylon.  Sterile dressing was applied and the patient was transferred to the recovery room in stable condition.         MADHAV BANUELOS MD             D: 2017 13:46   T: 2017 22:13   MT: FLOR#[id number]      Name:     ANEUDY LUCIA   MRN:      [id number]        Account:        [id number]   :      1965           Procedure Date: 2017      Document: [id number]

## 2019-04-08 ENCOUNTER — TRANSFERRED RECORDS (OUTPATIENT)
Dept: HEALTH INFORMATION MANAGEMENT | Facility: CLINIC | Age: 54
End: 2019-04-08

## 2019-05-03 ENCOUNTER — TRANSFERRED RECORDS (OUTPATIENT)
Dept: HEALTH INFORMATION MANAGEMENT | Facility: CLINIC | Age: 54
End: 2019-05-03

## 2019-05-20 ENCOUNTER — DOCUMENTATION ONLY (OUTPATIENT)
Dept: OTOLARYNGOLOGY | Facility: CLINIC | Age: 54
End: 2019-05-20

## 2019-05-20 NOTE — PROGRESS NOTES
ONCOLOGY INTAKE: Records Information      APPT INFORMATION:  Referring provider:  Dr. Magallanes  Referring provider s clinic:  [facility]  Reason for visit/diagnosis:  Tongue Cancer    ADDITIONAL INFORMATION:  Patient has decided to go to [facility]. Records sent to HIM for scanning

## 2023-05-28 ENCOUNTER — NURSE TRIAGE (OUTPATIENT)
Dept: NURSING | Facility: CLINIC | Age: 58
End: 2023-05-28
Payer: COMMERCIAL

## 2023-05-28 NOTE — TELEPHONE ENCOUNTER
Patient dropped a piece of metal on his left ankle on Thursday. Patient reports that it is black and blue and swollen. Patient can walk normal and rotate the ankle and bend toes but the ankle is tender to touch where the bruise is located on the top of his foot.   The bruise is from the top of the ankle down to the top of his toes.   Protocol recommends see PCP within 24 hours.  UC location and hours given. Care advice given. Patient reports he will go to a [facility] urgent care today.  Stacia Wright RN   05/28/23 3:26 PM  [facility] Nurse Advisor      Reason for Disposition    Large swelling or bruise (> 2 inches or 5 cm)    Additional Information    Negative: Serious injury with multiple fractures (broken bones)    Negative: [1] Major bleeding (e.g., actively dripping or spurting) AND [2] can't be stopped    Negative: Amputation    Negative: Looks like a dislocated joint (very crooked or deformed)    Negative: Sounds like a life-threatening emergency to the triager    Negative: Wound looks infected    Negative: Caused by an animal bite    Negative: Caused by a human bite    Negative: Puncture wound of foot    Negative: Toe injury is main concern    Negative: Cast problems or questions    Negative: Bullet wound, stabbed by knife, or other serious penetrating wound    Negative: Skin is split open or gaping (or length > 1/2 inch or 12 mm)    Negative: [1] Bleeding AND [2] won't stop after 10 minutes of direct pressure (using correct technique)    Negative: [1] Dirt in the wound AND [2] not removed with 15 minutes of scrubbing    Negative: Can't stand (bear weight) or walk    Negative: [1] Numbness (new loss of sensation) of toe(s) AND [2] present now    Negative: Sounds like a serious injury to the triager    Negative: [1] SEVERE pain AND [2] not improved 2 hours after pain medicine/ice packs    Negative: Suspicious history for the injury    Negative: [1] Limp when walking AND [2] due to a twisted ankle or foot     Negative: [1] Limp when walking AND [2] due to a direct blow or crushing injury    Protocols used: ANKLE AND FOOT INJURY-A-AH

## 2023-08-25 ENCOUNTER — APPOINTMENT (OUTPATIENT)
Dept: GENERAL RADIOLOGY | Facility: CLINIC | Age: 58
End: 2023-08-25
Attending: STUDENT IN AN ORGANIZED HEALTH CARE EDUCATION/TRAINING PROGRAM
Payer: MEDICARE

## 2023-08-25 ENCOUNTER — HOSPITAL ENCOUNTER (OUTPATIENT)
Facility: CLINIC | Age: 58
Setting detail: OBSERVATION
Discharge: HOME | End: 2023-08-26
Attending: STUDENT IN AN ORGANIZED HEALTH CARE EDUCATION/TRAINING PROGRAM | Admitting: NURSE PRACTITIONER
Payer: MEDICARE

## 2023-08-25 DIAGNOSIS — Z87.891 PERSONAL HISTORY OF TOBACCO USE, PRESENTING HAZARDS TO HEALTH: ICD-10-CM

## 2023-08-25 DIAGNOSIS — I10 HYPERTENSION, UNSPECIFIED TYPE: ICD-10-CM

## 2023-08-25 DIAGNOSIS — E11.69 TYPE 2 DIABETES MELLITUS WITH OTHER SPECIFIED COMPLICATION, UNSPECIFIED WHETHER LONG TERM INSULIN USE (H): ICD-10-CM

## 2023-08-25 DIAGNOSIS — Z79.4 ENCOUNTER FOR LONG-TERM (CURRENT) USE OF INSULIN (H): ICD-10-CM

## 2023-08-25 DIAGNOSIS — Z11.52 ENCOUNTER FOR SCREENING LABORATORY TESTING FOR SEVERE ACUTE RESPIRATORY SYNDROME CORONAVIRUS 2 (SARS-COV-2): Primary | ICD-10-CM

## 2023-08-25 DIAGNOSIS — I20.0 UNSTABLE ANGINA (H): ICD-10-CM

## 2023-08-25 DIAGNOSIS — R06.09 EXERTIONAL DYSPNEA: ICD-10-CM

## 2023-08-25 LAB
ANION GAP SERPL CALCULATED.3IONS-SCNC: 9 MMOL/L (ref 7–15)
BASOPHILS # BLD AUTO: 0 10E3/UL (ref 0–0.2)
BASOPHILS NFR BLD AUTO: 1 %
BUN SERPL-MCNC: 19.5 MG/DL (ref 6–20)
CALCIUM SERPL-MCNC: 9.4 MG/DL (ref 8.6–10)
CHLORIDE SERPL-SCNC: 101 MMOL/L (ref 98–107)
CREAT SERPL-MCNC: 1.18 MG/DL (ref 0.67–1.17)
DEPRECATED HCO3 PLAS-SCNC: 29 MMOL/L (ref 22–29)
EOSINOPHIL # BLD AUTO: 0.2 10E3/UL (ref 0–0.7)
EOSINOPHIL NFR BLD AUTO: 2 %
ERYTHROCYTE [DISTWIDTH] IN BLOOD BY AUTOMATED COUNT: 12.7 % (ref 10–15)
GFR SERPL CREATININE-BSD FRML MDRD: 72 ML/MIN/1.73M2
GLUCOSE SERPL-MCNC: 134 MG/DL (ref 70–99)
HCT VFR BLD AUTO: 41.4 % (ref 40–53)
HGB BLD-MCNC: 14.1 G/DL (ref 13.3–17.7)
IMM GRANULOCYTES # BLD: 0 10E3/UL
IMM GRANULOCYTES NFR BLD: 1 %
LIPASE SERPL-CCNC: 30 U/L (ref 13–60)
LYMPHOCYTES # BLD AUTO: 1.9 10E3/UL (ref 0.8–5.3)
LYMPHOCYTES NFR BLD AUTO: 24 %
MCH RBC QN AUTO: 31.3 PG (ref 26.5–33)
MCHC RBC AUTO-ENTMCNC: 34.1 G/DL (ref 31.5–36.5)
MCV RBC AUTO: 92 FL (ref 78–100)
MONOCYTES # BLD AUTO: 0.7 10E3/UL (ref 0–1.3)
MONOCYTES NFR BLD AUTO: 9 %
NEUTROPHILS # BLD AUTO: 5.1 10E3/UL (ref 1.6–8.3)
NEUTROPHILS NFR BLD AUTO: 63 %
NRBC # BLD AUTO: 0 10E3/UL
NRBC BLD AUTO-RTO: 0 /100
NT-PROBNP SERPL-MCNC: <36 PG/ML (ref 0–900)
PLATELET # BLD AUTO: 251 10E3/UL (ref 150–450)
POTASSIUM SERPL-SCNC: 4 MMOL/L (ref 3.4–5.3)
RBC # BLD AUTO: 4.51 10E6/UL (ref 4.4–5.9)
SODIUM SERPL-SCNC: 139 MMOL/L (ref 136–145)
TROPONIN T SERPL HS-MCNC: 11 NG/L
TROPONIN T SERPL HS-MCNC: 9 NG/L
WBC # BLD AUTO: 7.9 10E3/UL (ref 4–11)

## 2023-08-25 PROCEDURE — 84484 ASSAY OF TROPONIN QUANT: CPT | Performed by: STUDENT IN AN ORGANIZED HEALTH CARE EDUCATION/TRAINING PROGRAM

## 2023-08-25 PROCEDURE — 83880 ASSAY OF NATRIURETIC PEPTIDE: CPT | Performed by: STUDENT IN AN ORGANIZED HEALTH CARE EDUCATION/TRAINING PROGRAM

## 2023-08-25 PROCEDURE — 99285 EMERGENCY DEPT VISIT HI MDM: CPT | Mod: 25 | Performed by: STUDENT IN AN ORGANIZED HEALTH CARE EDUCATION/TRAINING PROGRAM

## 2023-08-25 PROCEDURE — 83690 ASSAY OF LIPASE: CPT | Performed by: STUDENT IN AN ORGANIZED HEALTH CARE EDUCATION/TRAINING PROGRAM

## 2023-08-25 PROCEDURE — 93010 ELECTROCARDIOGRAM REPORT: CPT | Performed by: STUDENT IN AN ORGANIZED HEALTH CARE EDUCATION/TRAINING PROGRAM

## 2023-08-25 PROCEDURE — 93005 ELECTROCARDIOGRAM TRACING: CPT | Performed by: STUDENT IN AN ORGANIZED HEALTH CARE EDUCATION/TRAINING PROGRAM

## 2023-08-25 PROCEDURE — 36415 COLL VENOUS BLD VENIPUNCTURE: CPT | Performed by: STUDENT IN AN ORGANIZED HEALTH CARE EDUCATION/TRAINING PROGRAM

## 2023-08-25 PROCEDURE — 80048 BASIC METABOLIC PNL TOTAL CA: CPT | Performed by: STUDENT IN AN ORGANIZED HEALTH CARE EDUCATION/TRAINING PROGRAM

## 2023-08-25 PROCEDURE — 85025 COMPLETE CBC W/AUTO DIFF WBC: CPT | Performed by: STUDENT IN AN ORGANIZED HEALTH CARE EDUCATION/TRAINING PROGRAM

## 2023-08-25 PROCEDURE — G0378 HOSPITAL OBSERVATION PER HR: HCPCS

## 2023-08-25 PROCEDURE — 250N000013 HC RX MED GY IP 250 OP 250 PS 637: Performed by: STUDENT IN AN ORGANIZED HEALTH CARE EDUCATION/TRAINING PROGRAM

## 2023-08-25 PROCEDURE — 71046 X-RAY EXAM CHEST 2 VIEWS: CPT

## 2023-08-25 RX ORDER — NITROGLYCERIN 0.4 MG/1
0.4 TABLET SUBLINGUAL EVERY 5 MIN PRN
Status: DISCONTINUED | OUTPATIENT
Start: 2023-08-25 | End: 2023-08-26 | Stop reason: HOSPADM

## 2023-08-25 RX ADMIN — NITROGLYCERIN 0.4 MG: 0.4 TABLET SUBLINGUAL at 20:53

## 2023-08-25 ASSESSMENT — ACTIVITIES OF DAILY LIVING (ADL)
ADLS_ACUITY_SCORE: 35
ADLS_ACUITY_SCORE: 35

## 2023-08-26 VITALS
DIASTOLIC BLOOD PRESSURE: 77 MMHG | BODY MASS INDEX: 39.75 KG/M2 | SYSTOLIC BLOOD PRESSURE: 123 MMHG | TEMPERATURE: 98.1 F | WEIGHT: 285 LBS | HEART RATE: 84 BPM | OXYGEN SATURATION: 96 % | RESPIRATION RATE: 16 BRPM

## 2023-08-26 LAB
ATRIAL RATE - MUSE: 79 BPM
ATRIAL RATE - MUSE: 90 BPM
DIASTOLIC BLOOD PRESSURE - MUSE: NORMAL MMHG
DIASTOLIC BLOOD PRESSURE - MUSE: NORMAL MMHG
INTERPRETATION ECG - MUSE: NORMAL
INTERPRETATION ECG - MUSE: NORMAL
P AXIS - MUSE: 49 DEGREES
P AXIS - MUSE: 50 DEGREES
PR INTERVAL - MUSE: 166 MS
PR INTERVAL - MUSE: 176 MS
QRS DURATION - MUSE: 100 MS
QRS DURATION - MUSE: 104 MS
QT - MUSE: 376 MS
QT - MUSE: 388 MS
QTC - MUSE: 444 MS
QTC - MUSE: 459 MS
R AXIS - MUSE: 20 DEGREES
R AXIS - MUSE: 33 DEGREES
SYSTOLIC BLOOD PRESSURE - MUSE: NORMAL MMHG
SYSTOLIC BLOOD PRESSURE - MUSE: NORMAL MMHG
T AXIS - MUSE: 51 DEGREES
T AXIS - MUSE: 56 DEGREES
VENTRICULAR RATE- MUSE: 79 BPM
VENTRICULAR RATE- MUSE: 90 BPM

## 2023-08-26 NOTE — ED TRIAGE NOTES
Patient reports that he has been having the same chest pain x1 week. Feels as if it feels like a deep squeezing sensation on L anterior chest. Increased SOB and jaw pain is what made patient come in today

## 2023-08-26 NOTE — DISCHARGE INSTRUCTIONS
You are seen in the emergency department due to chest pain.  You had labs, EKG and troponin here all of which are normal.  However the story for your chest pain is quite concerning at this point in time we would recommend that you follow-up with your cardiologist and have a cardiac stress test on.  Return to the emergency department with any worsening severe chest pain, feeling like you are going to pass out, difficulty breathing or any other concerning symptoms

## 2023-08-26 NOTE — ED PROVIDER NOTES
ED Provider Note  [facility]      History     Chief Complaint   Patient presents with    Chest Pain     HPI  Moreno Salvador is a 58 year old male with a past medical history of diabetes, hypertension, hyperlipidemia, and family cardiac disease, previous smoking history of approximately 30-pack-year history, alcohol abuse, chronic pain both of his back in his legs, and hands, previous pancreatitis, tongue cancer status post removalpresents emergency department due to gradually worsening central/left anterior chest pressure initially starting on Monday this progressively worsened since then, now with exertional dyspnea for the last day.    Patient notes that he has had fairly persistent chest pain that does get worse when he is up and walking around.  Today notes that he is unable to walk even to the bathroom and back without feeling dyspneic.  Does still feel chest pressure on his chest at this time 4 out of 10 in severity.  No significant weight gain or lower extremity edema compared to baseline.  No numbness, tingling or weakness of his arms or legs    Past Medical History  Past Medical History:   Diagnosis Date    Acute pancreatitis     Anxiety and depression     Bipolar disorder (H)     Bronchospasm     Chronic diarrhea     Eating disorder     binge eating, treated previously at [facility]    ED (erectile dysfunction)     Elevated PSA     Empyema of left pleural space (H)     Fatty liver     MATT (generalized anxiety disorder)     Gallbladder sludge     Hypogonadism in male     Lumbago     Migraines     Morbid obesity (H)     MRSA nasal colonization     Nicotine addiction     PONV (postoperative nausea and vomiting)     Psychological factors affecting medical condition     PTSD (post-traumatic stress disorder)     Pure hyperglyceridemia     Sleep apnea     uses CPAP    Tubular adenoma of colon     Type 2 diabetes mellitus with retinopathy (H)     insulin depentent     Past  Surgical History:   Procedure Laterality Date    ENDOSCOPIC RELEASE CARPAL TUNNEL Left 2017    Procedure: ENDOSCOPIC RELEASE CARPAL TUNNEL;  Surgeon: Kelli Oscar MD;  Location: [facility]    ENT SURGERY      outer ear procedure    EYE SURGERY      lasik    RELEASE CARPAL TUNNEL Right 2017    Procedure: RELEASE CARPAL TUNNEL;  RIGHT OPEN CARPAL TUNNEL RELEASE WITH FAT FLAP; GUYON'S CANAL RELEASE;  Surgeon: Kelli Oscar MD;  Location: [facility]    SOFT TISSUE SURGERY      excision lipoma from left forehead    THORACIC SURGERY      left empyema s/p decortication     THORACOSCOPIC DECORTICATION LUNG      TRANSPOSITION ULNAR NERVE (ELBOW) Left 2017    Procedure: TRANSPOSITION ULNAR NERVE (ELBOW);  Surgeon: Kelli Oscar MD;  Location: [facility]     AMITRIPTYLINE HCL PO  ASPIRIN PO  Atorvastatin Calcium (LIPITOR PO)  Cyclobenzaprine HCl (FLEXERIL PO)  GABAPENTIN PO  HydrOXYzine Pamoate (VISTARIL PO)  insulin reg HIGH CONC (HUMULIN R U-500 KWIKPEN) 500 UNIT/ML PEN soln  insulin reg HIGH CONC (HUMULIN R U-500 KWIKPEN) 500 UNIT/ML PEN soln  methylcellulose, laxative, (CITRUCEL) POWD  multivitamin, therapeutic with minerals (MULTI-VITAMIN) TABS tablet  NAPROXEN PO  oxyCODONE (ROXICODONE) 10 MG IR tablet  Probiotic PACK  Rizatriptan Benzoate (MAXALT-MLT PO)  sennosides (SENOKOT) 8.6 MG tablet  Sulfacetamide Sodium 10 % LIQD  Topiramate (TOPAMAX PO)  TRAMADOL HCL PO      Allergies   Allergen Reactions    Penicillin [Penicillins] Hives     Family History  No family history on file.  Social History   Social History     Tobacco Use    Smoking status: Former     Types: Cigarettes     Quit date: 2016     Years since quittin.6    Smokeless tobacco: Never    Tobacco comments:     smoked off and on for 20 yrs   Substance Use Topics    Alcohol use: Yes    Drug use: No     Comment: hx.of illicit drugs; alcohol treatment          A medically appropriate review of systems was  performed with pertinent positives and negatives noted in the HPI, and all other systems negative.    Physical Exam   BP: 121/78  Pulse: 97  Temp: 98.2  F (36.8  C)  Resp: 16  Weight: 129.3 kg (285 lb)  SpO2: 96 %  Physical Exam  GEN: Well appearing, non toxic, cooperative  HEENT: normocephalic and atraumatic, PERRLA, EOMI  CV: well-perfused, normal skin color for ethnicity, regular rate and rhythm, mild JVD  PULM: breathing comfortably, in no respiratory distress, clear to auscultation upper and lower lung fields  ABD: nondistended  EXT: Full range of motion.  2+ bilateral lower extremity edema  NEURO: awake, conversant, grossly normal bilateral upper and lower extremity strength & ROM   SKIN: No rashes, ecchymosis, or lacerations  PSYCH: Calm and cooperative, interactive      ED Course, Procedures, & Data      Procedures            EKG Interpretation:      Interpreted by Kim Montano MD  Time reviewed: 2018  Symptoms at time of EKG: chest pain, dyspnea   Rhythm: normal sinus   Rate: normal  Axis: normal  Ectopy: none  Conduction: normal  ST Segments/ T Waves: No ST-T wave changes  Q Waves: Inferior with poor R wave progression in the anterior leads  Comparison to prior: No old EKG available    Clinical Impression: n inferior Q waves with poor R wave progression in the anterior leads     Results for orders placed or performed during the hospital encounter of 08/25/23   XR Chest 2 Views     Status: None    Narrative    EXAM: XR CHEST 2 VIEWS  LOCATION: [facility]  DATE: 8/25/2023    INDICATION: chest pain  COMPARISON: None.      Impression    IMPRESSION:     Normal heart and mediastinal contours.    Symmetric lung inflation. No lung edema or consolidation.    Diaphragm curvature is preserved. No pleural effusion.    Surgical clips in the left neck.   Basic metabolic panel     Status: Abnormal   Result Value Ref Range    Sodium 139 136 - 145 mmol/L    Potassium 4.0 3.4 -  5.3 mmol/L    Chloride 101 98 - 107 mmol/L    Carbon Dioxide (CO2) 29 22 - 29 mmol/L    Anion Gap 9 7 - 15 mmol/L    Urea Nitrogen 19.5 6.0 - 20.0 mg/dL    Creatinine 1.18 (H) 0.67 - 1.17 mg/dL    Calcium 9.4 8.6 - 10.0 mg/dL    Glucose 134 (H) 70 - 99 mg/dL    GFR Estimate 72 >60 mL/min/1.73m2   Troponin T, High Sensitivity     Status: Normal   Result Value Ref Range    Troponin T, High Sensitivity 11 <=22 ng/L   Nt probnp inpatient     Status: Normal   Result Value Ref Range    N terminal Pro BNP Inpatient <36 0 - 900 pg/mL   Lipase     Status: Normal   Result Value Ref Range    Lipase 30 13 - 60 U/L   CBC with platelets and differential     Status: None   Result Value Ref Range    WBC Count 7.9 4.0 - 11.0 10e3/uL    RBC Count 4.51 4.40 - 5.90 10e6/uL    Hemoglobin 14.1 13.3 - 17.7 g/dL    Hematocrit 41.4 40.0 - 53.0 %    MCV 92 78 - 100 fL    MCH 31.3 26.5 - 33.0 pg    MCHC 34.1 31.5 - 36.5 g/dL    RDW 12.7 10.0 - 15.0 %    Platelet Count 251 150 - 450 10e3/uL    % Neutrophils 63 %    % Lymphocytes 24 %    % Monocytes 9 %    % Eosinophils 2 %    % Basophils 1 %    % Immature Granulocytes 1 %    NRBCs per 100 WBC 0 <1 /100    Absolute Neutrophils 5.1 1.6 - 8.3 10e3/uL    Absolute Lymphocytes 1.9 0.8 - 5.3 10e3/uL    Absolute Monocytes 0.7 0.0 - 1.3 10e3/uL    Absolute Eosinophils 0.2 0.0 - 0.7 10e3/uL    Absolute Basophils 0.0 0.0 - 0.2 10e3/uL    Absolute Immature Granulocytes 0.0 <=0.4 10e3/uL    Absolute NRBCs 0.0 10e3/uL   EKG 12 lead     Status: None (Preliminary result)   Result Value Ref Range    Systolic Blood Pressure  mmHg    Diastolic Blood Pressure  mmHg    Ventricular Rate 90 BPM    Atrial Rate 90 BPM    MA Interval 166 ms    QRS Duration 104 ms     ms    QTc 459 ms    P Axis 50 degrees    R AXIS 33 degrees    T Axis 56 degrees    Interpretation ECG       Sinus rhythm  Possible Inferior infarct , age undetermined  Abnormal ECG     CBC with platelets differential     Status: None    Narrative     The following orders were created for panel order CBC with platelets differential.  Procedure                               Abnormality         Status                     ---------                               -----------         ------                     CBC with platelets and d...[[id number]]                      Final result                 Please view results for these tests on the individual orders.     Medications   nitroGLYcerin (NITROSTAT) sublingual tablet 0.4 mg (0.4 mg Sublingual $Given 8/25/23 2053)     Labs Ordered and Resulted from Time of ED Arrival to Time of ED Departure   BASIC METABOLIC PANEL - Abnormal       Result Value    Sodium 139      Potassium 4.0      Chloride 101      Carbon Dioxide (CO2) 29      Anion Gap 9      Urea Nitrogen 19.5      Creatinine 1.18 (*)     Calcium 9.4      Glucose 134 (*)     GFR Estimate 72     TROPONIN T, HIGH SENSITIVITY - Normal    Troponin T, High Sensitivity 11     NT PROBNP INPATIENT - Normal    N terminal Pro BNP Inpatient <36     LIPASE - Normal    Lipase 30     CBC WITH PLATELETS AND DIFFERENTIAL    WBC Count 7.9      RBC Count 4.51      Hemoglobin 14.1      Hematocrit 41.4      MCV 92      MCH 31.3      MCHC 34.1      RDW 12.7      Platelet Count 251      % Neutrophils 63      % Lymphocytes 24      % Monocytes 9      % Eosinophils 2      % Basophils 1      % Immature Granulocytes 1      NRBCs per 100 WBC 0      Absolute Neutrophils 5.1      Absolute Lymphocytes 1.9      Absolute Monocytes 0.7      Absolute Eosinophils 0.2      Absolute Basophils 0.0      Absolute Immature Granulocytes 0.0      Absolute NRBCs 0.0     TROPONIN T, HIGH SENSITIVITY     XR Chest 2 Views   Final Result   IMPRESSION:       Normal heart and mediastinal contours.      Symmetric lung inflation. No lung edema or consolidation.      Diaphragm curvature is preserved. No pleural effusion.      Surgical clips in the left neck.             Critical care was not performed.     Medical  Decision Making  The patient's presentation was of high complexity (an acute health issue posing potential threat to life or bodily function).    The patient's evaluation involved:  review of external note(s) from 3+ sources (see separate area of note for details)  review of 3+ test result(s) ordered prior to this encounter (see separate area of note for details)  strong consideration of a test (see separate area of note for details) that was ultimately deferred    The patient's management necessitated high risk (a decision regarding hospitalization).    Assessment & Plan    58-year-old male with past medical history of alcohol abuse and pancreatitis, hypertension, hyperlipidemia, diabetes presents emergency department due to 5 days of worsening left anterior chest pressure without radiation now with exertional dyspnea noted to have mild JVD and lower extremity edema on physical exam.    Concerning for possible underlying NSTEMI or unstable angina.  Patient has significant inferior Q waves as well as poor anterior R wave progression.  Pending troponin at this time, note evidence of any active early ischemia on EKG however.  Low suspicion at this time for aortic dissection with no ripping or tearing pain, no worst pain of his life, no radiation of his pain to his back, no neurological deficits.  We will screen with chest x-ray.  Patient took aspirin prior to arrival will give nitroglycerin here.  Lower suspicion for musculoskeletal chest pain.  No pleuritic chest pain with low suspicion of PE.  10:44 PM labs, and including troponin normal.  Patient feeling better at this time.  We will plan for observation admission due to concern for unstable angina in the setting of his highly concerning story.    11:48 PM patient has complete resolution of his chest pain after nitroglycerin.  Discussed how this is concerning for possible underlying cardiac chest pain and at this time patient was recommended for observation stay  "for expedited cardiac stress test.  Patient is adamantly opposed to staying in the hospital.  States \"I would rather die than stay in the hospital overnight tonight.  I hate being in the hospital.\"  Notes that he plans to call his cardiologist on Monday for an outpatient stress test.  Discussed return precautions with the patient and he states his understanding.  Patient notes that there is nothing we can say that would make him want to stay tonight and his wife is at bedside and does not have any other further things to contribute.    I have reviewed the nursing notes. I have reviewed the findings, diagnosis, plan and need for follow up with the patient.    New Prescriptions    No medications on file       Final diagnoses:   Unstable angina (H)   Hypertension, unspecified type   Type 2 diabetes mellitus with other specified complication, unspecified whether long term insulin use (H)   Exertional dyspnea       Kim Montano MD  [facility] EMERGENCY DEPARTMENT  8/25/2023     Kim Montano MD  08/25/23 3336       Kim Montano MD  08/25/23 8727    "

## 2023-11-04 ENCOUNTER — HEALTH MAINTENANCE LETTER (OUTPATIENT)
Age: 58
End: 2023-11-04

## 2024-01-13 ENCOUNTER — HEALTH MAINTENANCE LETTER (OUTPATIENT)
Age: 59
End: 2024-01-13

## 2024-03-23 ENCOUNTER — HEALTH MAINTENANCE LETTER (OUTPATIENT)
Age: 59
End: 2024-03-23

## 2024-08-10 ENCOUNTER — HEALTH MAINTENANCE LETTER (OUTPATIENT)
Age: 59
End: 2024-08-10

## 2024-12-22 ENCOUNTER — HEALTH MAINTENANCE LETTER (OUTPATIENT)
Age: 59
End: 2024-12-22

## 2025-04-12 ENCOUNTER — HEALTH MAINTENANCE LETTER (OUTPATIENT)
Age: 60
End: 2025-04-12

## 2025-05-03 ENCOUNTER — HEALTH MAINTENANCE LETTER (OUTPATIENT)
Age: 60
End: 2025-05-03

## 2025-07-26 ENCOUNTER — HEALTH MAINTENANCE LETTER (OUTPATIENT)
Age: 60
End: 2025-07-26

## (undated) DEVICE — CAST PLASTER SPLINT 4X15" 7394

## (undated) DEVICE — DRSG ADAPTIC 3X8" 6113

## (undated) DEVICE — GLOVE PROTEXIS MICRO 6.5  2D73PM65

## (undated) DEVICE — PACK HAND WRIST SOP15HWFSP

## (undated) DEVICE — DRSG STERI STRIP 1/2X4" R1547

## (undated) DEVICE — LINEN TOWEL PACK X5 5464

## (undated) DEVICE — SOL NACL 0.9% IRRIG 1000ML BOTTLE 07138-09

## (undated) DEVICE — DRSG KERLIX 4 1/2"X4YDS ROLL 6715

## (undated) DEVICE — SU VICRYL 3-0 RB-1 27" UND J215H

## (undated) DEVICE — CAST PADDING 4" UNSTERILE 9044

## (undated) DEVICE — GLOVE PROTEXIS BLUE W/NEU-THERA 6.5  2D73EB65

## (undated) DEVICE — DRSG GAUZE 4X4" 3033

## (undated) DEVICE — SU ETHILON 4-0 PC-3 18" 1864G

## (undated) RX ORDER — LIDOCAINE HYDROCHLORIDE 20 MG/ML
INJECTION, SOLUTION EPIDURAL; INFILTRATION; INTRACAUDAL; PERINEURAL
Status: DISPENSED
Start: 2017-09-27

## (undated) RX ORDER — DEXAMETHASONE SODIUM PHOSPHATE 4 MG/ML
INJECTION, SOLUTION INTRA-ARTICULAR; INTRALESIONAL; INTRAMUSCULAR; INTRAVENOUS; SOFT TISSUE
Status: DISPENSED
Start: 2017-09-27

## (undated) RX ORDER — CLINDAMYCIN PHOSPHATE 900 MG/50ML
INJECTION, SOLUTION INTRAVENOUS
Status: DISPENSED
Start: 2017-09-27

## (undated) RX ORDER — ONDANSETRON 2 MG/ML
INJECTION INTRAMUSCULAR; INTRAVENOUS
Status: DISPENSED
Start: 2017-09-27

## (undated) RX ORDER — FENTANYL CITRATE 50 UG/ML
INJECTION, SOLUTION INTRAMUSCULAR; INTRAVENOUS
Status: DISPENSED
Start: 2017-09-27

## (undated) RX ORDER — OXYCODONE HYDROCHLORIDE 5 MG/1
TABLET ORAL
Status: DISPENSED
Start: 2017-09-27

## (undated) RX ORDER — PROPOFOL 10 MG/ML
INJECTION, EMULSION INTRAVENOUS
Status: DISPENSED
Start: 2017-09-27